# Patient Record
Sex: MALE | Race: OTHER
[De-identification: names, ages, dates, MRNs, and addresses within clinical notes are randomized per-mention and may not be internally consistent; named-entity substitution may affect disease eponyms.]

---

## 2017-11-17 ENCOUNTER — HOSPITAL ENCOUNTER (OUTPATIENT)
Dept: HOSPITAL 8 - CFH | Age: 42
Discharge: HOME | End: 2017-11-17
Attending: PHYSICIAN ASSISTANT
Payer: SELF-PAY

## 2017-11-17 DIAGNOSIS — I82.441: ICD-10-CM

## 2017-11-17 DIAGNOSIS — I82.811: Primary | ICD-10-CM

## 2020-05-30 ENCOUNTER — APPOINTMENT (OUTPATIENT)
Dept: RADIOLOGY | Facility: MEDICAL CENTER | Age: 45
End: 2020-05-30
Payer: OTHER GOVERNMENT

## 2020-05-30 ENCOUNTER — HOSPITAL ENCOUNTER (EMERGENCY)
Facility: MEDICAL CENTER | Age: 45
End: 2020-05-30
Attending: EMERGENCY MEDICINE
Payer: OTHER GOVERNMENT

## 2020-05-30 ENCOUNTER — APPOINTMENT (OUTPATIENT)
Dept: RADIOLOGY | Facility: MEDICAL CENTER | Age: 45
End: 2020-05-30
Attending: EMERGENCY MEDICINE
Payer: OTHER GOVERNMENT

## 2020-05-30 VITALS
DIASTOLIC BLOOD PRESSURE: 73 MMHG | WEIGHT: 162.48 LBS | OXYGEN SATURATION: 97 % | SYSTOLIC BLOOD PRESSURE: 113 MMHG | BODY MASS INDEX: 24.07 KG/M2 | HEART RATE: 84 BPM | TEMPERATURE: 98.4 F | RESPIRATION RATE: 16 BRPM | HEIGHT: 69 IN

## 2020-05-30 DIAGNOSIS — J18.9 PNEUMONIA OF BOTH LOWER LOBES DUE TO INFECTIOUS ORGANISM: ICD-10-CM

## 2020-05-30 DIAGNOSIS — Z20.822 SUSPECTED COVID-19 VIRUS INFECTION: ICD-10-CM

## 2020-05-30 DIAGNOSIS — R10.31 RLQ ABDOMINAL PAIN: ICD-10-CM

## 2020-05-30 LAB
ALBUMIN SERPL BCP-MCNC: 4.2 G/DL (ref 3.2–4.9)
ALBUMIN/GLOB SERPL: 1.3 G/DL
ALP SERPL-CCNC: 73 U/L (ref 30–99)
ALT SERPL-CCNC: 39 U/L (ref 2–50)
ANION GAP SERPL CALC-SCNC: 16 MMOL/L (ref 7–16)
APPEARANCE UR: CLEAR
AST SERPL-CCNC: 35 U/L (ref 12–45)
BACTERIA #/AREA URNS HPF: NEGATIVE /HPF
BASOPHILS # BLD AUTO: 0.2 % (ref 0–1.8)
BASOPHILS # BLD: 0.01 K/UL (ref 0–0.12)
BILIRUB SERPL-MCNC: 0.5 MG/DL (ref 0.1–1.5)
BILIRUB UR QL STRIP.AUTO: NEGATIVE
BUN SERPL-MCNC: 11 MG/DL (ref 8–22)
CALCIUM SERPL-MCNC: 8.3 MG/DL (ref 8.5–10.5)
CHLORIDE SERPL-SCNC: 94 MMOL/L (ref 96–112)
CO2 SERPL-SCNC: 20 MMOL/L (ref 20–33)
COLOR UR: YELLOW
COVID ORDER STATUS COVID19: NORMAL
CREAT SERPL-MCNC: 0.76 MG/DL (ref 0.5–1.4)
EOSINOPHIL # BLD AUTO: 0 K/UL (ref 0–0.51)
EOSINOPHIL NFR BLD: 0 % (ref 0–6.9)
EPI CELLS #/AREA URNS HPF: NEGATIVE /HPF
ERYTHROCYTE [DISTWIDTH] IN BLOOD BY AUTOMATED COUNT: 38.5 FL (ref 35.9–50)
GLOBULIN SER CALC-MCNC: 3.2 G/DL (ref 1.9–3.5)
GLUCOSE SERPL-MCNC: 107 MG/DL (ref 65–99)
GLUCOSE UR STRIP.AUTO-MCNC: NEGATIVE MG/DL
HCT VFR BLD AUTO: 40.9 % (ref 42–52)
HGB BLD-MCNC: 14.5 G/DL (ref 14–18)
IMM GRANULOCYTES # BLD AUTO: 0 K/UL (ref 0–0.11)
IMM GRANULOCYTES NFR BLD AUTO: 0 % (ref 0–0.9)
KETONES UR STRIP.AUTO-MCNC: NEGATIVE MG/DL
LACTATE BLD-SCNC: 1.1 MMOL/L (ref 0.5–2)
LEUKOCYTE ESTERASE UR QL STRIP.AUTO: NEGATIVE
LYMPHOCYTES # BLD AUTO: 0.73 K/UL (ref 1–4.8)
LYMPHOCYTES NFR BLD: 17.9 % (ref 22–41)
MCH RBC QN AUTO: 31.6 PG (ref 27–33)
MCHC RBC AUTO-ENTMCNC: 35.5 G/DL (ref 33.7–35.3)
MCV RBC AUTO: 89.1 FL (ref 81.4–97.8)
MICRO URNS: ABNORMAL
MONOCYTES # BLD AUTO: 0.36 K/UL (ref 0–0.85)
MONOCYTES NFR BLD AUTO: 8.8 % (ref 0–13.4)
NEUTROPHILS # BLD AUTO: 2.97 K/UL (ref 1.82–7.42)
NEUTROPHILS NFR BLD: 73.1 % (ref 44–72)
NITRITE UR QL STRIP.AUTO: NEGATIVE
NRBC # BLD AUTO: 0 K/UL
NRBC BLD-RTO: 0 /100 WBC
PH UR STRIP.AUTO: 6 [PH] (ref 5–8)
PLATELET # BLD AUTO: 150 K/UL (ref 164–446)
PMV BLD AUTO: 10.1 FL (ref 9–12.9)
POTASSIUM SERPL-SCNC: 3.7 MMOL/L (ref 3.6–5.5)
PROT SERPL-MCNC: 7.4 G/DL (ref 6–8.2)
PROT UR QL STRIP: NEGATIVE MG/DL
RBC # BLD AUTO: 4.59 M/UL (ref 4.7–6.1)
RBC # URNS HPF: ABNORMAL /HPF
RBC UR QL AUTO: ABNORMAL
SODIUM SERPL-SCNC: 130 MMOL/L (ref 135–145)
SP GR UR STRIP.AUTO: <=1.005
UROBILINOGEN UR STRIP.AUTO-MCNC: 0.2 MG/DL
WBC # BLD AUTO: 4.1 K/UL (ref 4.8–10.8)
WBC #/AREA URNS HPF: ABNORMAL /HPF

## 2020-05-30 PROCEDURE — 87086 URINE CULTURE/COLONY COUNT: CPT

## 2020-05-30 PROCEDURE — 700111 HCHG RX REV CODE 636 W/ 250 OVERRIDE (IP): Performed by: EMERGENCY MEDICINE

## 2020-05-30 PROCEDURE — 700105 HCHG RX REV CODE 258: Performed by: EMERGENCY MEDICINE

## 2020-05-30 PROCEDURE — 81001 URINALYSIS AUTO W/SCOPE: CPT

## 2020-05-30 PROCEDURE — 74177 CT ABD & PELVIS W/CONTRAST: CPT

## 2020-05-30 PROCEDURE — 700105 HCHG RX REV CODE 258

## 2020-05-30 PROCEDURE — 96365 THER/PROPH/DIAG IV INF INIT: CPT | Mod: XU

## 2020-05-30 PROCEDURE — 96367 TX/PROPH/DG ADDL SEQ IV INF: CPT

## 2020-05-30 PROCEDURE — 87040 BLOOD CULTURE FOR BACTERIA: CPT | Mod: 91

## 2020-05-30 PROCEDURE — 700117 HCHG RX CONTRAST REV CODE 255: Performed by: EMERGENCY MEDICINE

## 2020-05-30 PROCEDURE — C9803 HOPD COVID-19 SPEC COLLECT: HCPCS | Performed by: EMERGENCY MEDICINE

## 2020-05-30 PROCEDURE — 99285 EMERGENCY DEPT VISIT HI MDM: CPT

## 2020-05-30 PROCEDURE — 96375 TX/PRO/DX INJ NEW DRUG ADDON: CPT

## 2020-05-30 PROCEDURE — 80053 COMPREHEN METABOLIC PANEL: CPT

## 2020-05-30 PROCEDURE — 71045 X-RAY EXAM CHEST 1 VIEW: CPT

## 2020-05-30 PROCEDURE — 85025 COMPLETE CBC W/AUTO DIFF WBC: CPT

## 2020-05-30 PROCEDURE — 36415 COLL VENOUS BLD VENIPUNCTURE: CPT

## 2020-05-30 PROCEDURE — 83605 ASSAY OF LACTIC ACID: CPT

## 2020-05-30 RX ORDER — ONDANSETRON 2 MG/ML
4 INJECTION INTRAMUSCULAR; INTRAVENOUS ONCE
Status: COMPLETED | OUTPATIENT
Start: 2020-05-30 | End: 2020-05-30

## 2020-05-30 RX ORDER — SODIUM CHLORIDE 9 MG/ML
1000 INJECTION, SOLUTION INTRAVENOUS ONCE
Status: COMPLETED | OUTPATIENT
Start: 2020-05-30 | End: 2020-05-30

## 2020-05-30 RX ORDER — AZITHROMYCIN 500 MG/1
500 INJECTION, POWDER, LYOPHILIZED, FOR SOLUTION INTRAVENOUS ONCE
Status: COMPLETED | OUTPATIENT
Start: 2020-05-30 | End: 2020-05-30

## 2020-05-30 RX ORDER — KETOROLAC TROMETHAMINE 30 MG/ML
15 INJECTION, SOLUTION INTRAMUSCULAR; INTRAVENOUS ONCE
Status: COMPLETED | OUTPATIENT
Start: 2020-05-30 | End: 2020-05-30

## 2020-05-30 RX ORDER — ASPIRIN 325 MG
325 TABLET ORAL EVERY 6 HOURS PRN
COMMUNITY

## 2020-05-30 RX ORDER — DOXYCYCLINE 100 MG/1
100 CAPSULE ORAL 2 TIMES DAILY
Qty: 20 CAP | Refills: 0 | Status: SHIPPED | OUTPATIENT
Start: 2020-05-30 | End: 2020-06-09

## 2020-05-30 RX ORDER — SODIUM CHLORIDE 9 MG/ML
INJECTION, SOLUTION INTRAVENOUS
Status: COMPLETED
Start: 2020-05-30 | End: 2020-05-30

## 2020-05-30 RX ADMIN — SODIUM CHLORIDE: 9 INJECTION, SOLUTION INTRAVENOUS at 21:45

## 2020-05-30 RX ADMIN — SODIUM CHLORIDE 1000 ML: 9 INJECTION, SOLUTION INTRAVENOUS at 21:28

## 2020-05-30 RX ADMIN — IOHEXOL 100 ML: 350 INJECTION, SOLUTION INTRAVENOUS at 22:30

## 2020-05-30 RX ADMIN — ONDANSETRON HYDROCHLORIDE 4 MG: 2 SOLUTION INTRAMUSCULAR; INTRAVENOUS at 21:28

## 2020-05-30 RX ADMIN — KETOROLAC TROMETHAMINE 15 MG: 30 INJECTION, SOLUTION INTRAMUSCULAR at 21:28

## 2020-05-30 RX ADMIN — AZITHROMYCIN MONOHYDRATE 500 MG: 500 INJECTION, POWDER, LYOPHILIZED, FOR SOLUTION INTRAVENOUS at 22:31

## 2020-05-30 RX ADMIN — CEFTRIAXONE SODIUM 2 G: 2 INJECTION, POWDER, FOR SOLUTION INTRAMUSCULAR; INTRAVENOUS at 21:37

## 2020-05-30 SDOH — HEALTH STABILITY: MENTAL HEALTH: HOW OFTEN DO YOU HAVE A DRINK CONTAINING ALCOHOL?: 2-3 TIMES A WEEK

## 2020-05-31 NOTE — ED PROVIDER NOTES
ED Provider Note    Scribed for Salvatore Valle M.D. by Bakari Bermeo. 5/30/2020, 8:57 PM.    Primary care provider: Pcp Pt States None  Means of arrival: walk-in  History obtained from: patient  History limited by: none    CHIEF COMPLAINT  Chief Complaint   Patient presents with   • Abdominal Pain     Pt reports mild nonradiating stabbing RLQ abdominal pain on Friday evening, took aspirin with relief for two days. Symptoms returned on Monday evening with relief from aspirin. Pt reports pain returned again this evening at around 1800, pt did not take aspirin today. Pt states pain is worse after eating, denies nausea.       HPI  Shekhar Pollard is a 45 y.o. male who presents to the Emergency Department with complaints of a mild-moderate sharp right lower quadrant abdominal pain. No radiation. He has not had any nausea/vomiting. He reports a fever as well as some changes in taste. His pain is worsened after eating. He reports associated diarrhea. He denies any bloody stools, melena, dysuria, or hematuria. In terms of cough, the patient states that he coughs after drinking cold water, but otherwise, he has not had a cough. He has a prior history of appendectomy. No recent COVID-19 exposure.     PPE Note: I personally donned full PPE for all patient encounters during this visit, including being clean-shaven with an N95 respirator mask, gloves, and eye protection. Scribe remained outside the patient's room and did not have any contact with the patient for the duration of patient encounter.       REVIEW OF SYSTEMS  Pertinent positives include right lower quadrant abdominal pain, diarrhea, Pertinent negatives include nausea, vomiting, bloody stools, melena, dysuria, hematuria, or cough. All other systems negative.    PAST MEDICAL HISTORY  None noted when reviewed.     SURGICAL HISTORY   has a past surgical history that includes appendectomy.    SOCIAL HISTORY  Social History     Tobacco Use   • Smoking  "status: Never Smoker   • Smokeless tobacco: Never Used   Substance Use Topics   • Alcohol use: Yes     Frequency: 2-3 times a week   • Drug use: Never      Social History     Substance and Sexual Activity   Drug Use Never       FAMILY HISTORY  History reviewed. No pertinent family history.    CURRENT MEDICATIONS  Home Medications     Reviewed by Ashley Gilmore R.N. (Registered Nurse) on 05/30/20 at 2031  Med List Status: Partial   Medication Last Dose Status   aspirin (ASA) 325 MG Tab 5/29/2020 Active                ALLERGIES  No Known Allergies    PHYSICAL EXAM  VITAL SIGNS: /99   Pulse (!) 109   Temp (!) 38.1 °C (100.6 °F) (Oral)   Resp 18   Ht 1.753 m (5' 9\")   Wt 73.7 kg (162 lb 7.7 oz)   SpO2 94%   BMI 23.99 kg/m²     Constitutional: Well developed, Well nourished, no acute distress.   HENT: Normocephalic, Atraumatic,   Mask in place  Eyes: Conjunctiva normal, No discharge.   Neck: Supple, No stridor.   Cardiovascular: Tachycardic heart rate, Normal rhythm, No murmurs, equal pulses.   Pulmonary: Rales in the right base  Chest: No chest wall tenderness or deformity.   Abdomen:Soft, Mildly tender in the right flank and right mid abdomen, no McBurney's point tenderness, negative Harris's sign.  Back: No CVA tenderness.   Musculoskeletal: No major deformities noted, No tenderness.   Skin: Warm, Dry, No erythema, No rash.   Neurologic: Alert & oriented x 3, Normal motor function,  No focal deficits noted.   Psychiatric: Affect normal, Judgment normal, Mood normal.     LABS  Results for orders placed or performed during the hospital encounter of 05/30/20   Lactic acid (lactate)   Result Value Ref Range    Lactic Acid 1.1 0.5 - 2.0 mmol/L   CBC WITH DIFFERENTIAL   Result Value Ref Range    WBC 4.1 (L) 4.8 - 10.8 K/uL    RBC 4.59 (L) 4.70 - 6.10 M/uL    Hemoglobin 14.5 14.0 - 18.0 g/dL    Hematocrit 40.9 (L) 42.0 - 52.0 %    MCV 89.1 81.4 - 97.8 fL    MCH 31.6 27.0 - 33.0 pg    MCHC 35.5 (H) 33.7 - 35.3 g/dL "    RDW 38.5 35.9 - 50.0 fL    Platelet Count 150 (L) 164 - 446 K/uL    MPV 10.1 9.0 - 12.9 fL    Neutrophils-Polys 73.10 (H) 44.00 - 72.00 %    Lymphocytes 17.90 (L) 22.00 - 41.00 %    Monocytes 8.80 0.00 - 13.40 %    Eosinophils 0.00 0.00 - 6.90 %    Basophils 0.20 0.00 - 1.80 %    Immature Granulocytes 0.00 0.00 - 0.90 %    Nucleated RBC 0.00 /100 WBC    Neutrophils (Absolute) 2.97 1.82 - 7.42 K/uL    Lymphs (Absolute) 0.73 (L) 1.00 - 4.80 K/uL    Monos (Absolute) 0.36 0.00 - 0.85 K/uL    Eos (Absolute) 0.00 0.00 - 0.51 K/uL    Baso (Absolute) 0.01 0.00 - 0.12 K/uL    Immature Granulocytes (abs) 0.00 0.00 - 0.11 K/uL    NRBC (Absolute) 0.00 K/uL   COMP METABOLIC PANEL   Result Value Ref Range    Sodium 130 (L) 135 - 145 mmol/L    Potassium 3.7 3.6 - 5.5 mmol/L    Chloride 94 (L) 96 - 112 mmol/L    Co2 20 20 - 33 mmol/L    Anion Gap 16.0 7.0 - 16.0    Glucose 107 (H) 65 - 99 mg/dL    Bun 11 8 - 22 mg/dL    Creatinine 0.76 0.50 - 1.40 mg/dL    Calcium 8.3 (L) 8.5 - 10.5 mg/dL    AST(SGOT) 35 12 - 45 U/L    ALT(SGPT) 39 2 - 50 U/L    Alkaline Phosphatase 73 30 - 99 U/L    Total Bilirubin 0.5 0.1 - 1.5 mg/dL    Albumin 4.2 3.2 - 4.9 g/dL    Total Protein 7.4 6.0 - 8.2 g/dL    Globulin 3.2 1.9 - 3.5 g/dL    A-G Ratio 1.3 g/dL   URINALYSIS    Specimen: Urine   Result Value Ref Range    Color Yellow     Character Clear     Specific Gravity <=1.005 <1.035    Ph 6.0 5.0 - 8.0    Glucose Negative Negative mg/dL    Ketones Negative Negative mg/dL    Protein Negative Negative mg/dL    Bilirubin Negative Negative    Urobilinogen, Urine 0.2 Negative    Nitrite Negative Negative    Leukocyte Esterase Negative Negative    Occult Blood Trace (A) Negative    Micro Urine Req Microscopic    COVID/SARS CoV-2    Specimen: Nasopharyngeal; Respirate   Result Value Ref Range    COVID Order Status Received    ESTIMATED GFR   Result Value Ref Range    GFR If African American >60 >60 mL/min/1.73 m 2    GFR If Non  >60 >60  mL/min/1.73 m 2   URINE MICROSCOPIC (W/UA)   Result Value Ref Range    WBC 0-2 (A) /hpf    RBC 0-2 (A) /hpf    Bacteria Negative None /hpf    Epithelial Cells Negative /hpf      All labs reviewed by me.    RADIOLOGY  CT-ABDOMEN-PELVIS WITH   Final Result      1.  There is no acute inflammatory process within the abdomen or pelvis.   2.  There is mild hepatic fatty infiltration.   3.  Patchy bibasilar peripheral parenchymal opacities in both lower lobes which are suspicious for pneumonitis. Imaging features can be seen with COVID-19 pneumonia, though are nonspecific and can occur with a variety of infectious and noninfectious    processes.      DX-CHEST-PORTABLE (1 VIEW)   Final Result      1.  There is perihilar and lower lobe ill-defined parenchymal opacity which could represent a viral pneumonitis versus atelectasis.        The radiologist's interpretation of all radiological studies have been reviewed by me.    COURSE & MEDICAL DECISION MAKING  Pertinent Labs & Imaging studies reviewed. (See chart for details)    8:57 PM - Patient seen and examined at bedside. Patient will be treated with Toradol 15 mg, Zofran 4 mg and NS infusion 1L. Patient will be resuscitated with IV fluids due to tachycardia. Ordered CT-abdomen/pelvis, DX-chest, CBC w/ diff, CMP, UA, urine culture, blood cultures, and lactic acid to evaluate his symptoms. The differential diagnoses include but are not limited to: pneumonia vs colitis vs cholecystitis.     9:12 PM - Reviewed patient's xray film which is concerning for atypical pneumonia and or COVID-19. COVID testing was ordered. Patient will be treated with Rocepin 2 g IVPB and Azithromycin 500 mg IM.     11:27 PM - CT-abdomen/pelvis did not show any acute abnormalities within the patient's abdomen/pelvis. However, there was also evidence of COVID-19 pneumonia seen within the patient's lower lobes. Patient was reevaluated at bedside. He is feeling improved after being treated with IV fluids.  Discussed lab and radiology results with the patient and informed them of the plan for discharge. He will be contacted on a later date in regards to his COVID-19 test results. Discussed CDC self-isolation guidelines. I have provided him with a prescription for Doxycycline. Patient verbalizes understanding and agreement to this plan of care.      Medical Decision Making: Point time I think the patient's fever abdominal pain is likely secondary to COVID-19.  I was concerned the patient may have some colitis with an episode of diarrhea and mid right abdominal pain.  Patient had already had an appendectomy.  At this point in time patient has been told that he needs to self isolate.  A Covid19 swab is been sent but is a send out lab and will not be back for 2 or 3 days.  CT of the abdomen pelvis does not show any other acute abnormality therefore I think the patient be discharged because I cannot be certain if the patient has bacterial infection.  Will place patient on doxycycline.  He was given a dose of IV antibiotics here.    The patient will return for new or worsening symptoms and is stable at the time of discharge.    The patient is referred to a primary physician for blood pressure management, diabetic screening, and for all other preventative health concerns.    DISPOSITION:  Patient will be discharged home in stable condition.    FOLLOW UP:  Your doctor    In 1 week        OUTPATIENT MEDICATIONS:  Discharge Medication List as of 5/30/2020 11:29 PM      START taking these medications    Details   doxycycline (MONODOX) 100 MG capsule Take 1 Cap by mouth 2 times a day for 10 days., Disp-20 Cap,R-0, Print Rx Paper               FINAL IMPRESSION  1. RLQ abdominal pain    2. Pneumonia of both lower lobes due to infectious organism    3. Suspected COVID-19 virus infection          I, Bakari Bermeo (Scribe), am scribing for, and in the presence of, Salvatore Valle M.D.    Electronically signed by: Bakari Bermeo  (Scribe), 5/30/2020    ISalvatore M.D. personally performed the services described in this documentation, as scribed by Bakari Bermeo in my presence, and it is both accurate and complete.    The note accurately reflects work and decisions made by me.  Salvatore Valle M.D.  5/31/2020  2:48 AM

## 2020-05-31 NOTE — DISCHARGE INSTRUCTIONS
Return if you have increasing shortness of breath, blue lips, difficulty breathing, worsening abdominal pain or fever that will not go down with Tylenol.     Es probable que tenga manolo enfermedad viral y puede que tenga COVID-19. En chiqui momento no tenemos estudios disponibles en entorno ambulatorio aqui en el departamento de emergencias para COVID-19. Tambien tenemos estudios limitados para gripe y otros enfermedades viral debido a escasez nacional.  Por lo tanto, COVID-19 no se descarta y necesita permanecer en cuarentena casera hasta que todos los bill siguientes son verdaderos:  1. Estas 10 matt desde el inicio de sintomas  2. Teresa sintomas estan mejorando  3. Ha estado ella de fiebre anuel al menos 72 horas  Las pruebas solo se realizan a traves del distrito de allie en chiqui momento, puedes llamarles a [phone]. Despues de un proceso de seleccion telefonico, puede o no que le michelle la prueba.   Si desarrolla dificultad respiratoria significativa, lo que significa que es dificil caminar incluso distancias cortas sin tener que detenerse y recuperar el aliento, o te mareas mucho y esto es persistente entonces por favor regrese al departamento de emergencias.     You likely have a viral illness and may have COVID-19. At this point we do not have testing available in the outpatient setting here in the emergency department for COVID-19. We also have limited testing for flu and other viral illnesses due to national shortages.  Therefore, COVID-19 is not ruled out and you will need to remain in home quarantine until all three of the following are true:  You are 10 days from symptom onset  your symptoms are improving   you have been fever free for at least 72hours.  Testing is only occurring through the health district at this point; you may call them at [phone].  After a phone triage process you may or may not be tested.  If you develop significant shortness of breath, meaning that it is difficult for you to  walk even short distances without having to stop and catch your breath, or you become severely dizzy and this is persistent then please return to the emergency department.

## 2020-05-31 NOTE — ED TRIAGE NOTES
"Shekhar Alegre Atul   45 y.o. male   Chief Complaint   Patient presents with   • Abdominal Pain     Pt reports mild nonradiating stabbing RLQ abdominal pain on Friday evening, took aspirin with relief for two days. Symptoms returned on Monday evening with relief from aspirin. Pt reports pain returned again this evening at around 1800, pt did not take aspirin today. Pt states pain is worse after eating, denies nausea.      Pt amb to triage with steady gait for above complaint. Pt s/p appendectomy, reports he also had intermittent throbbing headache associated with RLQ pain. States he also took two doses of Theraflu yesterday with relief. Denies current HA.       Pt is alert and oriented, speaking in full sentences, follows commands and responds appropriately to questions. NAD. Resp are even and unlabored.     Sepsis score of 3. Charge RN made aware.     /99   Pulse (!) 109   Temp (!) 38.1 °C (100.6 °F) (Oral)   Resp 18   Ht 1.753 m (5' 9\")   Wt 73.7 kg (162 lb 7.7 oz)   SpO2 94%   BMI 23.99 kg/m²     "

## 2020-06-02 LAB
BACTERIA UR CULT: NORMAL
SIGNIFICANT IND 70042: NORMAL
SITE SITE: NORMAL
SOURCE SOURCE: NORMAL

## 2020-06-03 LAB
SARS-COV-2 RNA RESP QL NAA+PROBE: DETECTED
SPECIMEN SOURCE: ABNORMAL

## 2020-06-04 LAB
BACTERIA BLD CULT: NORMAL
BACTERIA BLD CULT: NORMAL
SIGNIFICANT IND 70042: NORMAL
SIGNIFICANT IND 70042: NORMAL
SITE SITE: NORMAL
SITE SITE: NORMAL
SOURCE SOURCE: NORMAL
SOURCE SOURCE: NORMAL

## 2020-06-04 NOTE — ED NOTES
COVID-19 Test Follow-Up  06/04/20    Patient is positive for COVID-19.    I have informed the patient of the positive result by phone and that the Health Dept would be in contact soon. Instructed them to continue to quarantine and self-isolate according with the CDC guidance or as otherwise directed by the Health Dept.    Per the CDC, he should continue to quarantine until: At least 3 days (72 hours) have passed since recovery defined as resolution of fever without the use of fever-reducing medications and improvement in respiratory symptoms (e.g., cough, shortness of breath); and, At least 10 days have passed since symptoms first appeared.    He states he is feeling 80% better and is ready to go back to work. Has a slight cough, but has had no fever for 2 days. He is advised to return to the ER for worsening symptoms including difficulty breathing, ongoing fever, weakness or chest pain.      Shelbi Lemos, PharmD

## 2020-06-08 ENCOUNTER — TELEPHONE (OUTPATIENT)
Dept: HEALTH INFORMATION MANAGEMENT | Facility: OTHER | Age: 45
End: 2020-06-08

## 2020-06-08 NOTE — TELEPHONE ENCOUNTER
Left voicemail for patient stating CDC guidelines for home isolation and contact information for medical questions & COVID testing through [facility]    CDC guidelines for home isolation.   a. Staying home and frequently washing your hands, and disinfecting commonly used household items (such as cellphone, remote, door handles, tabletops, faucets, etc.)  b.  You are wearing a mask or some sort of effective personal protection equipment (I can provide resources for them if needed), as well as covering your cough and avoiding sharing household items.   c. You are staying in your own room; besides caretaker coming and going to help-out, you are sleeping in your own space away from others.       If you have any medical questions or other community resources, please contact…  d. If it is a medical emergency, please call [phone].  e. Your Primary Care Provider, if you do not have one, I can provide information on clinics and telehealth doctors.   f. [facility]: [phone]  g. [facility] Triage Line: [phone]